# Patient Record
Sex: FEMALE | Race: BLACK OR AFRICAN AMERICAN | URBAN - METROPOLITAN AREA
[De-identification: names, ages, dates, MRNs, and addresses within clinical notes are randomized per-mention and may not be internally consistent; named-entity substitution may affect disease eponyms.]

---

## 2020-09-23 ENCOUNTER — OFFICE VISIT (OUTPATIENT)
Dept: FAMILY MEDICINE | Facility: CLINIC | Age: 30
End: 2020-09-23
Payer: COMMERCIAL

## 2020-09-23 VITALS
DIASTOLIC BLOOD PRESSURE: 85 MMHG | WEIGHT: 262 LBS | HEIGHT: 65 IN | OXYGEN SATURATION: 98 % | TEMPERATURE: 98.2 F | BODY MASS INDEX: 43.65 KG/M2 | SYSTOLIC BLOOD PRESSURE: 122 MMHG | RESPIRATION RATE: 16 BRPM | HEART RATE: 100 BPM

## 2020-09-23 DIAGNOSIS — F64.0 GENDER DYSPHORIA IN ADULT: Primary | ICD-10-CM

## 2020-09-23 LAB
% GRANULOCYTES: 56.9 %G (ref 40–75)
ALBUMIN SERPL-MCNC: 4.2 MG/DL (ref 3.8–5)
ALP SERPL-CCNC: 59.2 U/L (ref 31.7–110.5)
ALT SERPL-CCNC: 34.7 U/L (ref 0–45)
AST SERPL-CCNC: 26 U/L (ref 0–45)
BILIRUB SERPL-MCNC: 0.5 MG/DL (ref 0.2–1.3)
BILIRUBIN DIRECT: 0.2 MG/DL (ref 0.1–0.3)
CHOLEST SERPL-MCNC: 220.1 MG/DL (ref 0–200)
CHOLEST/HDLC SERPL: 4.8 {RATIO} (ref 0–5)
GLUCOSE SERPL-MCNC: 82 MG'DL (ref 70–99)
GRANULOCYTES #: 2.8 K/UL (ref 1.6–8.3)
HCT VFR BLD AUTO: 46.6 % (ref 35–47)
HDLC SERPL-MCNC: 46 MG/DL
HEMOGLOBIN: 13.1 G/DL (ref 11.7–15.7)
LDLC SERPL CALC-MCNC: 142 MG/DL (ref 0–129)
LYMPHOCYTES # BLD AUTO: 1.9 K/UL (ref 0.8–5.3)
LYMPHOCYTES NFR BLD AUTO: 37.9 %L (ref 20–48)
MCH RBC QN AUTO: 26.4 PG (ref 26.5–35)
MCHC RBC AUTO-ENTMCNC: 28.1 G/DL (ref 32–36)
MCV RBC AUTO: 93.7 FL (ref 78–100)
MID #: 0.3 K/UL (ref 0–2.2)
MID %: 5.2 %M (ref 0–20)
PLATELET # BLD AUTO: 229 K/UL (ref 150–450)
PROT SERPL-MCNC: 7.2 G/DL (ref 6.8–8.8)
RBC # BLD AUTO: 4.97 M/UL (ref 3.8–5.2)
TRIGL SERPL-MCNC: 159.4 MG/DL (ref 0–150)
VLDL CHOLESTEROL: 31.9 MG/DL (ref 7–32)
WBC # BLD AUTO: 5 K/UL (ref 4–11)

## 2020-09-23 RX ORDER — TESTOSTERONE CYPIONATE 1000 MG/10ML
100 INJECTION, SOLUTION INTRAMUSCULAR
COMMUNITY
End: 2021-05-17

## 2020-09-23 RX ORDER — ESCITALOPRAM OXALATE 10 MG/1
10 TABLET ORAL DAILY
COMMUNITY
End: 2021-05-17

## 2020-09-23 SDOH — HEALTH STABILITY: PHYSICAL HEALTH: ON AVERAGE, HOW MANY DAYS PER WEEK DO YOU ENGAGE IN MODERATE TO STRENUOUS EXERCISE (LIKE A BRISK WALK)?: 7 DAYS

## 2020-09-23 SDOH — ECONOMIC STABILITY: FOOD INSECURITY: WITHIN THE PAST 12 MONTHS, YOU WORRIED THAT YOUR FOOD WOULD RUN OUT BEFORE YOU GOT MONEY TO BUY MORE.: NEVER TRUE

## 2020-09-23 SDOH — SOCIAL STABILITY: SOCIAL INSECURITY: WITHIN THE LAST YEAR, HAVE YOU BEEN AFRAID OF YOUR PARTNER OR EX-PARTNER?: NO

## 2020-09-23 SDOH — ECONOMIC STABILITY: FOOD INSECURITY: WITHIN THE PAST 12 MONTHS, THE FOOD YOU BOUGHT JUST DIDN'T LAST AND YOU DIDN'T HAVE MONEY TO GET MORE.: NEVER TRUE

## 2020-09-23 SDOH — ECONOMIC STABILITY: TRANSPORTATION INSECURITY
IN THE PAST 12 MONTHS, HAS LACK OF TRANSPORTATION KEPT YOU FROM MEETINGS, WORK, OR FROM GETTING THINGS NEEDED FOR DAILY LIVING?: NO

## 2020-09-23 SDOH — SOCIAL STABILITY: SOCIAL INSECURITY
WITHIN THE LAST YEAR, HAVE TO BEEN RAPED OR FORCED TO HAVE ANY KIND OF SEXUAL ACTIVITY BY YOUR PARTNER OR EX-PARTNER?: NO

## 2020-09-23 SDOH — SOCIAL STABILITY: SOCIAL INSECURITY: WITHIN THE LAST YEAR, HAVE YOU BEEN HUMILIATED OR EMOTIONALLY ABUSED IN OTHER WAYS BY YOUR PARTNER OR EX-PARTNER?: NO

## 2020-09-23 SDOH — HEALTH STABILITY: PHYSICAL HEALTH: ON AVERAGE, HOW MANY MINUTES DO YOU ENGAGE IN EXERCISE AT THIS LEVEL?: 30 MIN

## 2020-09-23 SDOH — ECONOMIC STABILITY: INCOME INSECURITY: HOW HARD IS IT FOR YOU TO PAY FOR THE VERY BASICS LIKE FOOD, HOUSING, MEDICAL CARE, AND HEATING?: SOMEWHAT HARD

## 2020-09-23 SDOH — HEALTH STABILITY: MENTAL HEALTH
STRESS IS WHEN SOMEONE FEELS TENSE, NERVOUS, ANXIOUS, OR CAN'T SLEEP AT NIGHT BECAUSE THEIR MIND IS TROUBLED. HOW STRESSED ARE YOU?: VERY MUCH

## 2020-09-23 SDOH — SOCIAL STABILITY: SOCIAL INSECURITY
WITHIN THE LAST YEAR, HAVE YOU BEEN KICKED, HIT, SLAPPED, OR OTHERWISE PHYSICALLY HURT BY YOUR PARTNER OR EX-PARTNER?: NO

## 2020-09-23 SDOH — ECONOMIC STABILITY: TRANSPORTATION INSECURITY
IN THE PAST 12 MONTHS, HAS THE LACK OF TRANSPORTATION KEPT YOU FROM MEDICAL APPOINTMENTS OR FROM GETTING MEDICATIONS?: NO

## 2020-09-23 ASSESSMENT — MIFFLIN-ST. JEOR: SCORE: 1918.68

## 2020-09-23 NOTE — PATIENT INSTRUCTIONS
Here is the plan from today's visit    1. Gender dysphoria in adult  Follow up in 2 months  - Testosterone Total  - Hepatic Panel  - CBC with Diff Plt  - Lipid Cascade  - Glucose    neither   Some online resources for transgender health    [place] Transgender [facility]   Home to [facility] [address], [phone]. Also has support groups.  Http://www.[employer].org/   Wednesdays: Support Group 6-7:30pm for all gender variant people    [facility] Transgender Health  Increasing access to comprehensive, effective, and affirming healthcare services for trans and gender-variant communities.  http://www.transhealth.[facility].edu/trans?page=valdez-00-05    [facility]   Community-based non-profit committed to advancing the health & wellness of LGBTQ communities through research, education and advocacy. http://www.TrustYou.org    Safe, gender-neutral public restrooms in the [facility]   http://www.[employer].org//index.php?option=com_content&task=view&id=12&Itemid    Trans Youth Support Network and [employer]  For people 26 and under who identify as a trans or gender non-conforming person and want to be a part of an activist organization. Offers peer support, education and community building opportunities. Find them on Facebook!    Reclaim  RECLAIM offers mental and integrative health services for LGBTQ youth and their families.  http://www.reclaim-lgbtyouth.org/    Gender Spectrum, for Trans Youth  Gender Spectrum provides education, training and support to help create a gender sensitive and inclusive environment for all children and teens. http://www.genderspectrum.org/    [facility] FTM Resource Guide  Information on topics of interest to female-to-male (FTM, F2M) trans men, and their friends and loved ones.  http://ftmguide.org/    Also check out your local [employer] queer resource center!  LGBTQIA Services at [facility]:  "http://www.[facility]/lgbtqia/  LGBTQ@[facility] at [facility]: http://www.[facility]/multiculturallife/lgbtq/  GLBTA Programs office at  of : https://diversity.[facility]/glbta/    Thoughts of self harm?   Trans Lifeline can be reached at [phone]. This service is staffed by trans people 24/7.   For LGBT youth (ages 24 and younger) contemplating suicide, the [employer] Lifeline can be reached at [phone].    nor Gender Transition Law: Know your Rights. Last updated   From: National  Guild [place] Chapter  Http://nlg[place].TotSpot    Changing your Name  Anyone who is over the age of 18 and has lived in [place] for at least 6 months can file an application for a name change in [place]. The \"Application for a Name Change\" must be filed in the [facility] where the applicant currently lives. The applicant must either pay the filing fee (which is currently $322 in [facility] and $320 in [facility]) or get a fee waiver based on low income. Information about how to obtain a fee waiver is available on the [facility] court website at: www.[facility].gov/.    After the Application and related forms have been filled out, brought to the  to be filed, and the file fee has been paid, the  will give you a hearing date and time for when you must appear before a . Alternatively, the  may tell you how to schedule your hearing. Many courts have help centers and /or information on their websites to help people prepare to represent themselves at their hearing.     Under [place] law, a name change of a minor requires that both parents have notice that an Application for a Name Change for their minor child will be filed. The applicant must show proof that the non-applicant parent(s) have been served with a Notice of Hearing. If the non-applicant parent cannot be served, then the applicant must publish the Notice of Hearing in the legal " "newspaper in the county were the non-applicant parent was last known to have lived. If the applicant parent does not know who the other parent is, then they must bring a certified copy of the child's birth certificate to the court hearing to show that the non-applicant parent's name is not on the birth certificate.    An applicant for a name change must bring two witnesses to the court hearing to testify about their identity. If the Application is made on behalf of a minor, the minor must be present at the court hearing.     Before filing an Application for a Name Change, an applicant should keep two considerations in mind. First, it is illegal to change your name to avoid legal obligations like paying debts, being sued, or being arrested or charged with a crime. Second, any information in an individual's name change file may be accessed by the public unless the individual's name is being changed because they are in a victim or witness protection program.     If the  approves the Application, they will sign a document called an \"order Granting Name Change.\" This Order will be entered into the court's record. Changing important documents like 's licenses, Social Security cards, and bank accounts requires certified copies of the Order, which can be obtained from the  for a fee. One certified copy of the Order should also be kept with the individual to notify certain public agencies, officials or other third parties about their name change.     It is also common for individuals to begin using a new name without formally changing their name. This is legal as long as the individual is not using the new name to commit fraud or other illegal activity.    Changing your Birth Certificate  If you intend to change both your name and your gender marker on your birth certificate, you may file an Application for a Name Change with the court. Changes to your birth certificate will only be made if you " "can prove that there are exceptional circumstances that make changing the birth certificate necessary. You must state your reasons on your Application form and specifically ask the  to order the Office of Vital Records ([place] Dept of Health) to change the birth certificate, indicating which specific items on the birth certificate are to be changed, and then submit a certified copy of the court order to the Office of Vital Records with the change fee, which is currently $40. To determine if you can use your court order to amend a birth record, review the Requirements Checklist for Amending a Birth Record With a Court Order, available at www.health.state.mn.. An 's advice may be useful when pursuing this option.     Changing your 's Licence/State Identification  To change your name on your 's license or ID, bring the court order demonstrating your name change to a  and Vehicle Services location. Be sure to bring your current license/ID as well. Updated licenses are issued for $13.50 .    To change the gender marker on your license or ID requires a \"variance\" issued by the central office of [place]  and Vehicle services in [place]. You must complete a \"Petition for [facility] Rules 7410 Identify/Residency\" and file it with the central office, being sure to include photocopies of your supporting documentation (such as a new or amended birth certificate and/or a letter from a physician or surgeon confirming some form of permanent physical transition) and a check or money order for $10.    Changing your Name/Gender Marker with the Social Security Administration  Changing your name and gender marker with the Social Security Administration, which will result in both a change to your [facility] file and, at your request, a new card being issued, can be accomplished by submitting documentation of both your \"old\" and \"new\" legal identities, suca as a copy of the court order " "used to changed your name or birth certificate. The [facility] also requires a physician's letter indicating that some permanent physical transition has occurred.     Changing your Passport  A copy of the court order confirming your name change is required to change the name on your passport. To change the gender marker, the State Department requires a detailed statement from a surgeon or hospital indicating that you have, or plan on having corrective surgery. Note however, the the [facility] Department will simply \"stamp over\" the previous gender marker on your passport if you request an amendment, indicating the date on which the gender change took place. The State Department may require additional documents to process your request.     GOVERNMENT RESOURCE LIST    [facility]  [address] [place] [address]  [phone]  Www.[employer].[facility] Courts  [address] [place] [address]  [phone]  www.[place].gov    [place] Dept of Health and Office of Vital Records  [address] [address]  [place] [address]  [phone]  www.health.[facility].mn.[facility] Dept of Public Safety  and Vehicle Services  [address]  [place] [address]  [phone]  www.[facility].org/[place]/    [place] Social Security Offices  [phone]    [place] Office  [address] [place] [address]    [facility] Office  [address] [place] [address] [address]  [place] [address]       nor Informed Consent Form for Testosterone Therapy   This form refers to the use of testosterone by persons in the female-to-male spectrum who wish to become more masculine to reduce gender dysphoria and facilitate a more masculine gender presentation. While there are risks associated with taking testosterone, when appropriately prescribed it can greatly improve mental health and quality of life.   Please seek another opinion if you want additional perspective on any aspect of your care.     Masculinizing Effects     1. I understand " that testosterone may be prescribed to reduce female physical characteristics and masculinize my body.     2. I understand that the masculinizing effects of testosterone can take several months to a year to become noticeable, that the rate and degree of change can t be predicted, and that changes may not be complete for 2-5 years after I start testosterone.     3. I understand that these changes will likely be permanent even if I stop taking testosterone:    Lower voice pitch (i.e., voice becoming deeper).     Increased growth of hair, with thicker/coarser hairs, on arms, legs, chest, back, and abdomen.     Gradual growth of moustache/facial hair.     Hair loss at the temples and crown of the head, with the possibility of becoming completely bald.     Genital changes may or may not be permanent if testosterone is stopped. These include clitoral growth (typically 1-3 cm) and vaginal dryness.     4. I understand that the following changes are usually not permanent (that is, they will likely reverse if I stop taking testosterone). Although testosterone does not change these features, there are other treatments that may be helpful:    Acne, which may be severe and can cause permanent scarring if not treated.     Fat may redistribute to a more masculine pattern (decreased on buttocks/hips/thighs, increased in abdomen - changing from  pear shape  to  apple shape ).     Increased muscle mass and upper body strength.     Increased libido (sex drive).     Menstrual periods typically stop within 3-6 months of starting testosterone.     5. I understand that it is not known what the effects of testosterone are on fertility. Fertility is reduced and I may never be able to get pregnant, even if I stop testosterone. On the other hand, even if my period stops, it may still be possible for me to get pregnant, and I am aware of birth control options (if applicable). I have been informed that I can t take testosterone if I am  pregnant.     6. I understand that there are some aspects of my body that will not be changed by testosterone:     Breasts may appear slightly smaller due to fat loss, but will not substantially shrink.     Although voice pitch will likely drop, other aspects of speech will not become more masculine.     Risks of Testosterone     7. I understand that the medical effects and safety of testosterone are not fully understood, and that there may be long-term risks that are not yet known.     8. I understand that I am strongly advised not to take more testosterone than I am prescribed, as this increases health risks. I have been informed that taking more will not make masculinization happen more quickly or increase the degree of change.     9. I understand that testosterone can cause changes that increase my risk of heart disease, including:     decreasing good cholesterol (HDL) and increasing bad cholesterol (LDL)     increasing blood pressure     increasing deposits of fat around my internal organs    I have been advised that my risks of heart disease are greater if people in my family have had heart disease, if I am overweight, or if I smoke.   I have been advised that heart health checkups, including monitoring of my weight and cholesterol levels, should be done periodically as long as I am taking testosterone.     10. I understand that testosterone can damage the liver, possibly leading to liver disease. I have been advised that I should be monitored for as long as I am taking testosterone.     11. I understand that testosterone can increase the red blood cells and hemoglobin, and while the increase is usually only to a normal male range (which does not pose health risks), a high increase can cause potentially life-threatening problems such as stroke and heart attack. I have been advised that my blood should be monitored periodically while I am taking testosterone.     12. I understand that taking testosterone can  increase my risk for diabetes by decreasing my body s response to insulin, causing weight gain, and increasing deposits of fat around my internal organs. I have been advised that my fasting blood glucose should be monitored periodically while I am taking testosterone.     13. I understand that it is not known if testosterone increases the risk of ovarian, breast, or uterine cancer.     14. I understand that taking testosterone can lead to my cervix and the walls of my vagina becoming more fragile, and that this can lead to tears or abrasions that increase the risk of sexually transmitted infections (including HIV) if I have vaginal sex - no matter what the gender of my partner is. I have been advised that camryn discussion with my doctor about my sexual practices can help determine how best to prevent and monitor for sexually transmitted infections.     15. I have been informed that testosterone can cause headaches or migraines. I understand that if I am frequently having headaches or migraines, or the pain is unusually severe, it is recommended that I talk with my health care provider.     16. I understand that testosterone can cause emotional changes, including increased irritability, frustration, and anger. I have been advised that my doctor can assist me in finding resources to explore and cope with these changes.     17. I understand that testosterone will result in changes that will be noticeable by other people, and that some transgender people in similar circumstances have experienced harassment, discrimination, and violence, while others have lost support of loved ones. I have been advised that my doctor can assist me in finding advocacy and support resources.     Prevention of Medical Complications     18. I agree to take testosterone as prescribed and to tell my doctor if I am not happy with the treatment or am experiencing any problems.     19. I understand that the right dose or type of medication  prescribed for me may not be the same as for someone else.     20. I understand that physical examinations and blood tests are needed on a regular basis to check for negative side effects of testosterone.     21. I understand that testosterone can interact with other medication (including other sources of hormones), dietary supplements, herbs, alcohol, and street drugs. I understand that being honest with my doctor about what else I am taking will help prevent medical complications that could be life-threatening. I have been informed that I will continue to get medical care no matter what information I share.     22. I understand that some medical conditions make it dangerous to take testosterone. I agree that I will be checked for risky conditions before the decision to take testosterone is made.     23. I understand that I can choose to stop taking testosterone at any time, and that it is advised that I do this with the help of my doctor to make sure there are no negative reactions to stopping. I understand that my doctor may suggest I reduce or stop taking testosterone if there are severe side effects or health risks that can t be controlled.     My signature below confirms that:     My doctor has talked with me about the benefits and risks of testosterone, the possible or likely consequences of hormone therapy, and potential alternative treatment options.     I understand the risks that may be involved.     I understand that this form covers known effects and risks and that there may be long-term effects or risks that are not yet known.     I have had sufficient opportunity to discuss treatment options with my doctor. All of my questions have been answered to my satisfaction.     I believe I have adequate knowledge on which to base informed consent to the provision of testosterone therapy.     Based on this:   _____ I wish to begin taking testosterone.     _____ I do not wish to begin taking testosterone at this  time.     Whatever your current decision is, please talk with your doctor any time you have questions, concerns, or want to re-evaluate your options.         ____________________________________ __________________   Patient Signature     Date           ____________________________________ __________________   Prescribing clinician Signature    Date      Please call or return to clinic if your symptoms don't go away.    Follow up plan  Please make a clinic appointment for follow up with me (VAL LOVE) in 2  months for HRT Follow.    Thank you for coming to [facility] today.  Lab Testing:  **If you had lab testing today and your results are reassuring or normal they will be mailed to you or sent through [employer] within 7 days.   **If the lab tests need quick action we will call you with the results.  The phone number we will call with results is # [phone] (home) . If this is not the best number please call our clinic and change the number.  Medication Refills:  If you need any refills please call your pharmacy and they will contact us.   If you need to  your refill at a new pharmacy, please contact the new pharmacy directly. The new pharmacy will help you get your medications transferred faster.   Scheduling:  If you have any concerns about today's visit or wish to schedule another appointment please call our office during normal business hours [phone] (8-5:00 M-F)  If a referral was made to a [facility] and you don't get a call from central scheduling please call [phone].  If a Mammogram was ordered for you at [facility] call [phone] to schedule or change your appointment.  If you had an XRay/CT/Ultrasound/MRI ordered the number is [phone] to schedule or change your radiology appointment.   Medical Concerns:  If you have urgent medical concerns please call [phone] at any time of the day.    Val Love MD

## 2020-09-23 NOTE — PROGRESS NOTES
Preceptor Attestation:    Patient seen and evaluated in person. I discussed the patient with the resident. I have verified the content of the note, which accurately reflects my assessment of the patient and the plan of care.   Supervising Physician:  Germaine Anderson MD.

## 2020-09-23 NOTE — PROGRESS NOTES
"Gender History Intake:       HPI     Patient has primary care outside of [facility]? no  Seeking primary care, hormonal care, surgical care? Seeking primary care and hormonal care moved here from [place], [place] 1 month.     1-How do you identify your gender? Choose all that apply.  other Trans persons; ligia soler   2-What approximately what age did you first feel your gender identity (internal sense of gender) did not match your physical body?      19 years old   3-Have you ever felt depressed or suicidal because your gender identity and your body don't match?      YES ; now feels pretty good  4- Who knows about your gender identity?  everyone     5- Do you have a romantic partner?    \"Yes\" (two)   Are they aware of your GI?  yes  6-How are you \"out?\"    Pretty out; dressing, name (changed legally), pronound    7-Have you legally changed your name? yes    If yes: prior name for KYLE:    Gender marker on ID's?  no  8-Have you ever seen a health care provider about being transgender?  YES Steven Quispe back in [place]   When were you first treated and where? 2013  9-What hormones have you been on and for how long?  YES:  Date started: 2013  Type of Hormone: Testosterone SQ     10-Ever had any problems with hormone treatment?  No   11-If not on hormones, would you like to be?   N/A  12-What are your goals for hormone therapy ?  Maintaining    13-Have you had any gender affirmation surgeries?  YES top surgery; [place] [facility] [place]  14-Do you want to have surgery now or in future? No   15-Sex assigned at birth?    female  16. How do you describe your sexual or romantic orientation? Karlo  17-What are your other questions or concerns today?  Is there anything else we should know about you?       YES Lexapro and weight gain and switching to gels    ----------  TK is a 30 year old individual that uses  pronouns Zie/Zim/Zir/Zis/Zieself that presents today for interest in masculinizing hormone therapy to better align " their body with their gender identity.  Came to this clinic via referral from: word of mouth    Past Surgical History:   Procedure Laterality Date     top surgery Bilateral 2018       There is no problem list on file for this patient.    Past Medical History:   Diagnosis Date     Depressive disorder        Current Outpatient Medications   Medication Sig Dispense Refill     escitalopram (LEXAPRO) 10 MG tablet Take 10 mg by mouth daily       testosterone cypionate (DEPOTESTOSTERONE) 100 MG/ML injection Inject 100 mg into the muscle every 14 days Takes weekly         History   Smoking Status     Never Smoker   Smokeless Tobacco     Never Used       Family History   Problem Relation Age of Onset     Heart Disease Mother      Hypertension Mother      Diabetes Father      Heart Disease Father      Hypertension Father      Hyperlipidemia Father      Hx of DVT in family? no  No Known Allergies    Mental Health Assessment:  Non gender related Therapist? Yes  Gender therapist?    No  Chemical use history   No  Mental Health diagnosis history Depression  Self harm   History in past   Cutting in teenage years and early twenties    Current   no  - Depression   No flowsheet data found.  No flowsheet data found.  Medications prescribed for above and by whom? Lexapro previous PCP  KYLE sent to:  Previous PCP         Review of Systems:   CONSTITUTIONAL: NEGATIVE for fever, chills, change in weight  INTEGUMENTARY/SKIN: NEGATIVE for worrisome rashes, moles or lesions  EYES: NEGATIVE for vision changes or irritation  ENT/MOUTH: NEGATIVE for ear, mouth and throat problems  RESP: NEGATIVE for significant cough or SOB  BREAST: NEGATIVE for masses, tenderness or discharge  CV: NEGATIVE for chest pain, palpitations or peripheral edema  GI: NEGATIVE for nausea, abdominal pain, heartburn, or change in bowel habits  : NEGATIVE for frequency, dysuria, or hematuria  MUSCULOSKELETAL: NEGATIVE for significant arthralgias or myalgia  NEURO:  NEGATIVE for weakness, dizziness or paresthesias  ENDOCRINE: NEGATIVE for temperature intolerance, skin/hair changes  HEME/ALLERGY: NEGATIVE for bleeding problems  PSYCHIATRIC: NEGATIVE for changes in mood or affect         Social History     Social History     Socioeconomic History     Marital status: Single     Spouse name: None     Number of children: None     Years of education: None     Highest education level: None   Occupational History     None   Social Needs     Financial resource strain: None     Food insecurity     Worry: None     Inability: None     Transportation needs     Medical: None     Non-medical: None   Tobacco Use     Smoking status: Never Smoker     Smokeless tobacco: Never Used   Substance and Sexual Activity     Alcohol use: Yes     Comment: social drinker     Drug use: Yes     Types: Marijuana     Sexual activity: Yes     Partners: Female, Male   Lifestyle     Physical activity     Days per week: None     Minutes per session: None     Stress: None   Relationships     Social connections     Talks on phone: None     Gets together: None     Attends Zoroastrianism service: None     Active member of club or organization: None     Attends meetings of clubs or organizations: None     Relationship status: None     Intimate partner violence     Fear of current or ex partner: None     Emotionally abused: None     Physically abused: None     Forced sexual activity: None   Other Topics Concern     None   Social History Narrative     None     Who lives in your household? With mother  What do you do?  work    Has anyone hurt you physically, for example by pushing, hitting, slapping or kicking you or forcing you to have sex? Denies  Do you feel threatened or controlled by a partner, ex-partner or anyone in your life? Denies    Sexual Health     Sexual concerns:  No   Hx of sexual abuse:  No   STI History:   Neg  Pregnancy History:  No obstetric history on file.  Last Pap Smear :  No results found for:  "PAP  Abnormal Pap Hx:  None    Relationships  Partners include:   cismen  ciswomen  gender nonbinary people  transmen  transwomen  Current partners number: 2  Current partners    have sperm?  no   able to get pregnant? no   Fertility plans?   None; maybe in the future           Physical Exam:     Vitals:    09/23/20 1311   BP: 122/85   Pulse: 100   Resp: 16   Temp: 98.2  F (36.8  C)   TempSrc: Oral   SpO2: 98%   Weight: 118.8 kg (262 lb)   Height: 1.65 m (5' 4.96\")     BMI= Body mass index is 43.65 kg/m .   Appearance: masculine appearance and dress  GENERAL:: healthy, alert and no distress  EYES: Eyes grossly normal to inspection, fundi benign and PERRL  NECK: no adenopathy, no asymmetry, no masses,  and thyroid normal to palpation, supple  RESP: lungs clear to auscultation - no rales, no rhonchi, no wheezes  CV: regular rates and rhythm, normal S1 S2, no S3 or S4 and no murmur, no click or rub -  ABDOMEN: soft, no tenderness, no  hepatosplenomegaly, no masses, normal bowel sounds  MS: extremities normal- no gross deformities noted, no edema  SKIN: no suspicious lesions, no rashes  Psych: Alert and oriented times 3; coherent speech, normal rate and volume, able to articulate logical thoughts, able to abstract reason, no tangential thoughts, no hallucinations or delusions. Affect is \"happy\".  Affect: Appropriate/mood-congruent      Assessment and Plan   Javi was seen today for establish care.    Diagnoses and all orders for this visit:    Gender dysphoria in adult  -     Testosterone Total  -     Hepatic Panel  -     CBC with Diff Plt  -     Lipid Cascade  -     Glucose      Today s visit included assessment of interventions to alleviate symptoms related to gender dysphoria, including     psychological suppor    medical treatment (hormones or blockers)    options for social support or changes in gender expression.   Educated about 3 parts of evaluation:    1. Medical safety for hormones :   Labs done today, see " above   Lacks contraindications to hormonal therapy  KYLE for records sent, will need to be reviewed by: Dr. Val Love  Medication plan:   masculinizing hormone therapy with testosterone     Administration method:  transdermal, topical, injectable  Next labs and exam:   Follow up w/ Dr. Love in 2 months, I will send this note to them. Educated pt about consultant role in a residency clinic.  Recommend monthly visits after dose change or initiation. Next dose increase likely in 2 month if labs and exam are normal.  Goal dose: likely 100 mg weekly  Counselled patient about controlled substances, never share, comes on paper prescription: Yes  Contraception:   none  Educated about testosterone as absolute contraindication in pregnancy: Yes  Fertility plan if starts cross-sex hormones: Thinking about this as a future option, has been on T for years    2. Mental health assessment (may refer out for this if needed)  To be completed by: Dr. Love  Diagnoses are stable and/or are likely to become stabilized by treatment of gender dysphoria    3. Informed consent process.   Consent  Yes Is able to provide informed consent   Yes Likely to take hormones in a responsible manner  Yes Discussed physical effects, benefits, and risk assessment & modification  Yes Discussed the clinical and biochemical monitoring of hormonal changes and the potential impact on reproductive health & fertility  We discussed thoroughly risks/benefits/alternatives of this treatment. Questions elicited and answered. Pt is fully prepared to start hormones and is able to reason through risks and mgmt. Questions elicited and answered and they also consent, as is legally required. See scanned in media tab.     Val Love MD

## 2020-09-25 ENCOUNTER — TELEPHONE (OUTPATIENT)
Dept: FAMILY MEDICINE | Facility: CLINIC | Age: 30
End: 2020-09-25

## 2020-09-25 LAB — TESTOST SERPL-MCNC: 386 NG/DL (ref 8–60)

## 2020-09-25 NOTE — TELEPHONE ENCOUNTER
RN left message for patient to call back. When patient calls back, please relay message below from Dr. Love. Okay to transfer to any available RN if they have questions.   JI Higginbotham, Val Solis MD  [facility] Triage Nurse               Please call patient with the following message Testosterone is at goal at 386, however you cholesterol and lipid levels are slightly elevated. At this level it should not affect still being on testosterone. I think we can try to bring this back down with diet and exercise. At least 30-60 min a day of exercise that gets your heart rate elevated and decreasing the amount of fat in your diet, try looking at the Mediterranean type diet. Please feel free to call with any questions or concerns.     Thank you!

## 2020-09-28 NOTE — TELEPHONE ENCOUNTER
Spoke with patient and relayed information below. Patient verbalized understanding and had no further questions.    Lali Barroso RN

## 2021-03-24 ENCOUNTER — OFFICE VISIT (OUTPATIENT)
Dept: FAMILY MEDICINE | Facility: CLINIC | Age: 31
End: 2021-03-24
Payer: COMMERCIAL

## 2021-03-24 VITALS
WEIGHT: 271.8 LBS | BODY MASS INDEX: 43.68 KG/M2 | DIASTOLIC BLOOD PRESSURE: 88 MMHG | HEART RATE: 88 BPM | RESPIRATION RATE: 16 BRPM | TEMPERATURE: 99.6 F | SYSTOLIC BLOOD PRESSURE: 135 MMHG | OXYGEN SATURATION: 97 % | HEIGHT: 66 IN

## 2021-03-24 DIAGNOSIS — Z13.9 SCREENING FOR CONDITION: ICD-10-CM

## 2021-03-24 DIAGNOSIS — R06.83 SNORING: ICD-10-CM

## 2021-03-24 DIAGNOSIS — F64.0 GENDER DYSPHORIA IN ADULT: Primary | ICD-10-CM

## 2021-03-24 PROCEDURE — 99214 OFFICE O/P EST MOD 30 MIN: CPT | Mod: 25 | Performed by: STUDENT IN AN ORGANIZED HEALTH CARE EDUCATION/TRAINING PROGRAM

## 2021-03-24 PROCEDURE — 90715 TDAP VACCINE 7 YRS/> IM: CPT | Performed by: STUDENT IN AN ORGANIZED HEALTH CARE EDUCATION/TRAINING PROGRAM

## 2021-03-24 PROCEDURE — 90471 IMMUNIZATION ADMIN: CPT | Performed by: STUDENT IN AN ORGANIZED HEALTH CARE EDUCATION/TRAINING PROGRAM

## 2021-03-24 RX ORDER — ESTRADIOL 0.1 MG/G
2 CREAM VAGINAL
Qty: 42.5 G | Refills: 0 | Status: SHIPPED | OUTPATIENT
Start: 2021-03-25

## 2021-03-24 RX ORDER — TESTOSTERONE 1.62 MG/G
2-3 GEL TRANSDERMAL DAILY
Qty: 75 G | Refills: 1 | Status: SHIPPED | OUTPATIENT
Start: 2021-03-24

## 2021-03-24 ASSESSMENT — MIFFLIN-ST. JEOR: SCORE: 1967.5

## 2021-03-24 NOTE — PROGRESS NOTES
Preceptor Attestation:   Patient seen and discussed with the resident. Assessment and plan reviewed with resident and agreed upon.   Supervising Physician:  DO [facility]

## 2021-03-24 NOTE — PROGRESS NOTES
"       HPI   TK is a 31 year old individual that uses pronouns Zie/Zim/Zir/Zis/Zieself that presents today for follow up of:  masculinizing hormone therapy.     Gender identity: trans person    Any special concerns today?  concerns about sleeping  Mom noticed snoring loudly during sleep and would wake up gasping for air. Fatigue can depend on the day. Sleepy most of the time, but not always needing naps. Harder to wake up in the morning. No headaches. No SOB. Sleep gets interrupted a lot, more from poor sleep. Gets 6-8 hours depending.     STOP BANG: 3-4 depending \"gender\"    On hormones?  YES +++ Shot day of the week? Tuesday      Due for labs?  No (only needs annually given on T 7 years)     +++ Refills of meds needed?  Yes    ---    Past Surgical History:   Procedure Laterality Date     top surgery Bilateral 2018       There is no problem list on file for this patient.      Current Outpatient Medications   Medication Sig Dispense Refill     escitalopram (LEXAPRO) 10 MG tablet Take 10 mg by mouth daily       estradiol (ESTRACE) 0.1 MG/GM vaginal cream Place 2 g vaginally twice a week Please use 5-14 days before PAP 42.5 g 0     testosterone (ANDROGEL 1.62 % PUMP) 20.25 MG/ACT gel Place 2-3 Pump onto the skin daily Apply from dispenser to clean, dry, intact skin of the upper arms and shoulders. 75 g 1     testosterone cypionate (DEPOTESTOSTERONE) 100 MG/ML injection Inject 100 mg into the muscle every 14 days Takes weekly         History   Smoking Status     Never Smoker   Smokeless Tobacco     Never Used        No Known Allergies    Problem, Medication and Allergy Lists were reviewed and are current..         Review of Systems:        General    Fat redistribution: no    Weight change: no HEENT    Voice change: no     Cardiovascular (CV)    Chest Pains: no    Shortness of breath: no Chest    Decreased exercise tolerance:  no    Breast changes/development: no     Gastrointestinal (GI)    Abdominal pain: " "no    Change in appetite: no Skin    Acne or oily skin: no    Change in hair: no     Genitourinary ()    Abnormal vaginal bleeding: no     Decreased spontaneous erections: not applicable    Change in libido: no    New sexual partners: YES- One new sexual, non sperm producing  Musculoskeletal    Leg pain or swelling: no     Psychiatric (Psych)    Depression: no    Anxiety/Panic: no    Mood:  \"okay\"                    Physical Exam:     Vitals:    03/24/21 1306   BP: 135/88   Pulse: 88   Resp: 16   Temp: 99.6  F (37.6  C)   TempSrc: Oral   SpO2: 97%   Weight: 123.3 kg (271 lb 12.8 oz)   Height: 1.665 m (5' 5.55\")     BMI= Body mass index is 44.47 kg/m .   Wt Readings from Last 10 Encounters:   03/24/21 123.3 kg (271 lb 12.8 oz)   09/23/20 118.8 kg (262 lb)       GENERAL:: healthy, alert and no distress  NECK: no adenopathy, no asymmetry, no masses,  and thyroid normal to palpation, supple, measured 46 cm  RESP: lungs clear to auscultation - no rales, no rhonchi, no wheezes  CV: regular rates and rhythm, normal S1 S2, no S3 or S4 and no murmur, no click or rub -  SKIN: no suspicious lesions, no rashes  Affect: Bright           Labs:   Results from last visit:  Office Visit on 09/23/2020   Component Date Value Ref Range Status     Testosterone Total 09/23/2020 386* 8 - 60 ng/dL Final    Comment: This test was developed and its performance characteristics determined by the   [facility].   It has not been cleared or approved by the FDA. The laboratory is regulated   under CLIA as qualified to perform high-complexity testing. This test is used   for clinical purposes. It should not be regarded as investigational or for   research.       Protein Total 09/23/2020 7.2  6.8 - 8.8 g/dL Final     Albumin 09/23/2020 4.2  3.8 - 5.0 mg/dL Final     Alkaline Phosphatase 09/23/2020 59.2  31.7 - 110.5 U/L Final     ALT 09/23/2020 34.7  0.0 - 45.0 U/L Final     AST " 09/23/2020 26.0  0.0 - 45.0 U/L Final     Bilirubin Direct 09/23/2020 0.2  0.1 - 0.3 mg/dL Final     Bilirubin Total 09/23/2020 0.5  0.2 - 1.3 mg/dL Final     WBC 09/23/2020 5.0  4.0 - 11.0 K/uL Final     Lymphocytes # 09/23/2020 1.9  0.8 - 5.3 K/uL Final     % Lymphocytes 09/23/2020 37.9  20.0 - 48.0 %L Final     Mid # 09/23/2020 0.3  0.0 - 2.2 K/uL Final     Mid % 09/23/2020 5.2  0.0 - 20.0 %M Final     GRANULOCYTES # 09/23/2020 2.8  1.6 - 8.3 K/uL Final     % Granulocytes 09/23/2020 56.9  40.0 - 75.0 %G Final     RBC 09/23/2020 4.97  3.80 - 5.20 M/uL Final     Hemoglobin 09/23/2020 13.1  11.7 - 15.7 g/dL Final     Hematocrit 09/23/2020 46.6  35.0 - 47.0 % Final     MCV 09/23/2020 93.7  78.0 - 100.0 fL Final     MCH 09/23/2020 26.4* 26.5 - 35.0 pg Final     MCHC 09/23/2020 28.1* 32.0 - 36.0 g/dL Final     Platelets 09/23/2020 229.0  150.0 - 450.0 K/uL Final     Cholesterol 09/23/2020 220.1* 0.0 - 200.0 mg/dL Final     Cholesterol/HDL Ratio 09/23/2020 4.8  0.0 - 5.0 Final     HDL Cholesterol 09/23/2020 46.0  >40.0 mg/dL Final     Triglycerides 09/23/2020 159.4* 0.0 - 150.0 mg/dL Final     VLDL Cholesterol 09/23/2020 31.9  7.0 - 32.0 mg/dL Final     LDL Cholesterol Calculated 09/23/2020 142* 0 - 129 mg/dL Final     Glucose 09/23/2020 82.0  70.0 - 99.0 mg'dL Final       Assessment and Plan     1. Gender dysphoria in adult  Patient wanting to switch from injectable to gel form. Discussed may take awhile for approval and is more expensive then IM/SQ. Will need labs 1 month after switch to assess dosing.   - Testosterone Total; Future  - Hepatic Panel; Future  - CBC with Diff Plt; Future  - Lipid Cascade; Future  - Glucose; Future  - testosterone (ANDROGEL 1.62 % PUMP) 20.25 MG/ACT gel; Place 2-3 Pump onto the skin daily Apply from dispenser to clean, dry, intact skin of the upper arms and shoulders.  Dispense: 75 g; Refill: 1  - estradiol (ESTRACE) 0.1 MG/GM vaginal cream; Place 2 g vaginally twice a week Please use  "5-14 days before PAP  Dispense: 42.5 g; Refill: 0    2. Screening for condition  Patient noted to have snoring and apenic events by family. Stop-BANG score 3-4 depending on \"gender\", either way high-risk.   - SLEEP EVALUATION & MANAGEMENT REFERRAL - [facility] [phone] (Age 15 and up); Future  - HIV Antigen Antibody Combo; Future  - Hepatitis C antibody; Future    3. Snoring  - SLEEP EVALUATION & MANAGEMENT REFERRAL - [facility] [phone] (Age 15 and up); Future    Contraception:   No sperm producing partner.     Counselled patient about controlled substances: Yes    Follow up:  Follow up in 1 month.  Results by mychart  Questions were elicited and answered.     Val Love MD            "

## 2021-03-24 NOTE — PATIENT INSTRUCTIONS
Patient Education   Here is the plan from today's visit    1. Gender dysphoria in adult  - Androgel 2-3 pumps, daily, make sure dry after 4 hours before skin on skin with others  - Testosterone Total; Future  - Hepatic Panel; Future  - CBC with Diff Plt; Future  - Lipid Cascade; Future  - Glucose; Future    2. Screening for condition  - HIV and Hep C  - estrogen cream for future PAP (use 5-14 days prior)    3. Snoring  - Sleep apnea study, number to schedule [phone]      Please call or return to clinic if your symptoms don't go away.    Follow up plan  Please make a clinic appointment for follow up with me (HONG SULLIVAN) in 3  months for HRT.    Thank you for coming to [facility] today.  Lab Testing:  **If you had lab testing today and your results are reassuring or normal they will be mailed to you or sent through [employer] within 7 days.   **If the lab tests need quick action we will call you with the results.  The phone number we will call with results is # [phone] (home) . If this is not the best number please call our clinic and change the number.  Medication Refills:  If you need any refills please call your pharmacy and they will contact us.   If you need to  your refill at a new pharmacy, please contact the new pharmacy directly. The new pharmacy will help you get your medications transferred faster.   Scheduling:  If you have any concerns about today's visit or wish to schedule another appointment please call our office during normal business hours [phone] (8-5:00 M-F)  If a referral was made to a [facility] and you don't get a call from central scheduling please call [phone].  If a Mammogram was ordered for you at [facility] call [phone] to schedule or change your appointment.  If you had an XRay/CT/Ultrasound/MRI ordered the number is [phone] to schedule or change your radiology appointment.   Medical Concerns:  If you have  urgent medical concerns please call [phone] at any time of the day.    Val Love MD

## 2021-03-25 ENCOUNTER — TELEPHONE (OUTPATIENT)
Dept: FAMILY MEDICINE | Facility: CLINIC | Age: 31
End: 2021-03-25

## 2021-03-25 NOTE — TELEPHONE ENCOUNTER
Prior Authorization Retail Medication Request    Medication/Dose: Testosterone   ICD code Gender dysphoria in adult [F64.0]  - Primary     Insurance Name: [employer]  Insurance ID:        Subscriber: [id number]

## 2021-03-31 NOTE — TELEPHONE ENCOUNTER
PA Initiation    Medication: testosterone (ANDROGEL 1.62 % PUMP) 20.25 MG/ACT gel- INITATED  Insurance Company: [employer] - Phone [phone] Fax [phone]  Pharmacy Filling the Rx: "[id number] - [place], [place] - [address] AT [facility] [address]  Filling Pharmacy Phone: [phone]  Filling Pharmacy Fax: [phone]  Start Date: 3/31/2021

## 2021-03-31 NOTE — TELEPHONE ENCOUNTER
Prior Authorization Approval    Authorization Effective Date: 3/31/2021  Authorization Expiration Date: 3/30/2026  Medication: testosterone 20.25 MG/ACT gel- APPROVED  Approved Dose/Quantity: 75 GRAM  Reference #: [id number]   Insurance Company: [employer] - Phone [phone] Fax [phone]  Expected CoPay:       CoPay Card Available:      Foundation Assistance Needed:    Which Pharmacy is filling the prescription (Not needed for infusion/clinic administered): [id number] - [place], [place] - [address] AT [facility] [address]  Pharmacy Notified: Yes  Patient Notified: Yes              Central Prior Authorization Team  Phone: [phone]

## 2021-05-01 ENCOUNTER — HEALTH MAINTENANCE LETTER (OUTPATIENT)
Age: 31
End: 2021-05-01

## 2021-05-17 ENCOUNTER — VIRTUAL VISIT (OUTPATIENT)
Dept: SLEEP MEDICINE | Facility: CLINIC | Age: 31
End: 2021-05-17
Payer: COMMERCIAL

## 2021-05-17 VITALS — BODY MASS INDEX: 46.07 KG/M2 | HEIGHT: 63 IN | WEIGHT: 260 LBS

## 2021-05-17 DIAGNOSIS — R40.0 DAYTIME SOMNOLENCE: ICD-10-CM

## 2021-05-17 DIAGNOSIS — R06.81 WITNESSED APNEIC SPELLS: Primary | ICD-10-CM

## 2021-05-17 DIAGNOSIS — E66.01 MORBID OBESITY (H): ICD-10-CM

## 2021-05-17 DIAGNOSIS — R06.83 SNORING: ICD-10-CM

## 2021-05-17 PROCEDURE — 99203 OFFICE O/P NEW LOW 30 MIN: CPT | Mod: GT | Performed by: NURSE PRACTITIONER

## 2021-05-17 ASSESSMENT — MIFFLIN-ST. JEOR: SCORE: 1873.48

## 2021-05-17 NOTE — PROGRESS NOTES
"TK is a 31 year old who is being evaluated via a billable video visit.       How would you like to obtain your AVS? MyChart  If the video visit is dropped, the invitation should be resent by: Send to e-mail at: [email or web address]  Will anyone else be joining your video visit? No         Video-Visit Details     Type of service:  Video Visit  Video Start Time: 1:32 PM  Video End Time:2:00 PM    Originating Location (pt. Location): Home     Distant Location (provider location):  [facility]      Platform used for Video Visit: [facility], 5/17/2021 10:34 AM      Sleep Consultation:    Date on this visit: 5/17/2021    Javi Carranza is sent by Val Love for a sleep consultation regarding loud snoring, waking up gasping, daytime somnolence, difficulty sleeping, evaluate for SHERRI.    Primary Physician: Val Love     Javi Carranza is a 31-year-old patient with a past medical history pertinent for depression who presents via video telehealth visit with reports of nightly snoring, gasping, choking, snorting, poor quality of sleep and \"dehydrated\" for at least the last 6 months.     Javi goes to sleep at 11:00 PM - 12:00 AM during the week.  The patient wakes up at 7:00 - 8:30 AM with an alarm.  The patient falls asleep in 5-20 minutes.  Javi has difficulty falling asleep.  The patient wakes up 2-3 times a night for 5-45 minutes before falling back to sleep.  Javi wakes up to go to the bathroom, uncertain reasons and awakens due to gasping.  On weekends, Javi goes to sleep at 1:00 - 2:00 AM.  The patient wakes up at 8:00 AM with an alarm.  Javi falls asleep in 5-20 minutes.  Patient gets an average of 5-7 hours of sleep per night.     Patient does use electronics in bed and watch TV in bed and does not leave the TV on all night, worry in bed about nothing and read in bed.     Javi does do shift work.  The patient works day " shifts and occasional evening events must attend at [facility].    Javi does snore every night and snoring is loud. Patient does not have a regular bed partner. There is report of snoring, gasping and poor quality of sleep.  Javi does have witnessed apneas.  Patient sleeps on her side. Javi has occasional snort arousals, morning dry mouth and morning confusion, denies occasional morning headaches and restless legs. Javi has frequent bruxism 7 times per week and denies any sleep walking, sleep talking, dream enactment, sleep paralysis, cataplexy and hypnogogic/hypnopompic hallucinations.    Javi denies sleep walking, sleep talking, enuresis and sleep terrors as a child.  Javi has difficulty breathing through her nose, reflux at night and heartburn, denies claustrophobia and depression.      Javi has gained 15 pounds in the last year.  Patient describes themself as neither a morning or night person.  She would prefer to go to sleep at 10:00 PM and wake up at 9:00 AM.  Patient's Forest Sleepiness score 7/24 consistent with mild daytime sleepiness.      Javi naps 1-2 times per weekend for 120-180 minutes, feels refreshed after naps. Javi takes some inadvertant naps.  Javi does not drive.  Javi uses 1 cups/day of coffee. Last caffeine intake is usually before 12 Noon - 1PM.    Allergies:    No Known Allergies    Medications:    Current Outpatient Medications   Medication Sig Dispense Refill     estradiol (ESTRACE) 0.1 MG/GM vaginal cream Place 2 g vaginally twice a week Please use 5-14 days before PAP 42.5 g 0     testosterone (ANDROGEL 1.62 % PUMP) 20.25 MG/ACT gel Place 2-3 Pump onto the skin daily Apply from dispenser to clean, dry, intact skin of the upper arms and shoulders. 75 g 1       Problem List:  There are no active problems to display for this patient.       Past Medical/Surgical History:  Past Medical History:   Diagnosis Date     Depressive disorder      Past  Surgical History:   Procedure Laterality Date     top surgery Bilateral 2018       Social History:  Social History     Socioeconomic History     Marital status: Single     Spouse name: Not on file     Number of children: Not on file     Years of education: Not on file     Highest education level: Not on file   Occupational History     Occupation: assissant director      Employer: [employer]   Social Needs     Financial resource strain: Somewhat hard     Food insecurity     Worry: Never true     Inability: Never true     Transportation needs     Medical: No     Non-medical: No   Tobacco Use     Smoking status: Never Smoker     Smokeless tobacco: Never Used   Substance and Sexual Activity     Alcohol use: Yes     Comment: social drinker     Drug use: Yes     Types: Marijuana     Sexual activity: Yes     Partners: Female, Male   Lifestyle     Physical activity     Days per week: 7 days     Minutes per session: 30 min     Stress: Very much   Relationships     Social connections     Talks on phone: Not on file     Gets together: Not on file     Attends [employer] service: Not on file     Active member of club or organization: Not on file     Attends meetings of clubs or organizations: Not on file     Relationship status: Not on file     Intimate partner violence     Fear of current or ex partner: No     Emotionally abused: No     Physically abused: No     Forced sexual activity: No   Other Topics Concern     Not on file   Social History Narrative     Not on file       Family History:  Family History   Problem Relation Age of Onset     Heart Disease Mother      Hypertension Mother      Diabetes Father      Heart Disease Father      Hypertension Father      Hyperlipidemia Father      - Dad with SHERRI on CPAP      Review of Systems:  A complete review of systems reviewed by me is negative with the exeption of what has been mentioned in the history of present illness.  CONSTITUTIONAL: NEGATIVE for weight  "gain/loss, fever, chills, sweats or night sweats, drug allergies.  EYES: NEGATIVE for changes in vision, blind spots, double vision.  ENT: NEGATIVE for ear pain, sore throat, sinus pain, post-nasal drip, runny nose, bloody nose  CARDIAC: NEGATIVE for fast heartbeats or fluttering in chest, chest pain or pressure, breathlessness when lying flat, swollen legs or swollen feet.  NEUROLOGIC: NEGATIVE headaches, weakness or numbness in the arms or legs.  DERMATOLOGIC: NEGATIVE for rashes, new moles or change in mole(s)  PULMONARY: NEGATIVE SOB at rest, SOB with activity, dry cough, productive cough, coughing up blood, wheezing or whistling when breathing.    GASTROINTESTINAL: NEGATIVE for nausea or vomitting, loose or watery stools, fat or grease in stools, constipation, abdominal pain, bowel movements black in color or blood noted.  GENITOURINARY: NEGATIVE for pain during urination, blood in urine, urinating more frequently than usual, irregular menstrual periods.  MUSCULOSKELETAL: NEGATIVE for muscle pain, bone or joint pain, swollen joints.  ENDOCRINE: NEGATIVE for increased thirst or urination, diabetes.  LYMPHATIC: NEGATIVE for swollen lymph nodes, lumps or bumps in the breasts or nipple discharge.    Physical Examination:  Vitals: Ht 1.6 m (5' 3\")   Wt 117.9 kg (260 lb)   BMI 46.06 kg/m    BMI= Body mass index is 46.06 kg/m .         Hunter Total Score 5/13/2021   Total score - Hunter 7          GENERAL APPEARANCE: healthy, alert, no distress, cooperative and smiling  EYES: Eyes grossly normal to inspection  RESP: Unlabored, easy breathing with normal conversational speech  CV: color normal  NEURO: Alert and oriented x3, speech normal  PSYCH: mentation appears normal and affect normal/bright  Mallampati Class: Unable to assess  Tonsillar Stage: Unable to assess    Impression/Plan:  1. Witnessed apneic spells  2. Snoring  3. Morbid obesity (H)  4. Daytime somnolence    - SLEEP EVALUATION & MANAGEMENT REFERRAL - " "ADULT -[facility] [place] [phone] (Age 15 and up)  - HST-Home Sleep Apnea Test; Future    This is a pleasant 31-year-old transgender patient with a past medical history pertinent for depression who presents via video telehealth visit with reports of nightly snoring, gasping, choking, snorting, poor quality of sleep and \"dehydrated\" for at least the last 6 months.  The patient's symptoms and risk factors are consistent with possible obstructive sleep apnea syndrome.    We discussed the pathophysiology of obstructive sleep apnea and the risks associated with untreated SHERRI.  We also discussed the pros and cons of in lab polysomnography versus home sleep testing.  The patient has elected to undergo HST to further evaluate for sleep disordered breathing.    Literature provided regarding sleep apnea.      She will follow up with me in approximately two weeks after her sleep study has been competed to review the results and discuss plan of care.       Polysomnography reviewed.  Obstructive sleep apnea reviewed.  Complications of untreated sleep apnea were reviewed.      ELIA Falk CNP  Sleep Medicine      40 minutes were spent on the date of the encounter doing chart review, history and exam, documentation and further activities as noted above.       CC: Val Love        "

## 2021-05-17 NOTE — PROGRESS NOTES
TK is a 31 year old who is being evaluated via a billable video visit.      How would you like to obtain your AVS? MyChart  If the video visit is dropped, the invitation should be resent by: Send to e-mail at: [email or web address]  Will anyone else be joining your video visit? Amanda FUCHS CMA, [facility], 5/17/2021 10:34 AM

## 2021-05-17 NOTE — PATIENT INSTRUCTIONS
Your BMI is Body mass index is 46.06 kg/m .  Weight management is a personal decision.  If you are interested in exploring weight loss strategies, the following discussion covers the approaches that may be successful. Body mass index (BMI) is one way to tell whether you are at a healthy weight, overweight, or obese. It measures your weight in relation to your height.  A BMI of 18.5 to 24.9 is in the healthy range. A person with a BMI of 25 to 29.9 is considered overweight, and someone with a BMI of 30 or greater is considered obese. More than two-thirds of American adults are considered overweight or obese.  Being overweight or obese increases the risk for further weight gain. Excess weight may lead to heart disease and diabetes.  Creating and following plans for healthy eating and physical activity may help you improve your health.  Weight control is part of healthy lifestyle and includes exercise, emotional health, and healthy eating habits. Careful eating habits lifelong are the mainstay of weight control. Though there are significant health benefits from weight loss, long-term weight loss with diet alone may be very difficult to achieve- studies show long-term success with dietary management in less than 10% of people. Attaining a healthy weight may be especially difficult to achieve in those with severe obesity. In some cases, medications, devices and surgical management might be considered.  What can you do?  If you are overweight or obese and are interested in methods for weight loss, you should discuss this with your provider.     Consider reducing daily calorie intake by 500 calories.     Keep a food journal.     Avoiding skipping meals, consider cutting portions instead.    Diet combined with exercise helps maintain muscle while optimizing fat loss. Strength training is particularly important for building and maintaining muscle mass. Exercise helps reduce stress, increase energy, and improves fitness.  Increasing exercise without diet control, however, may not burn enough calories to loose weight.       Start walking three days a week 10-20 minutes at a time    Work towards walking thirty minutes five days a week     Eventually, increase the speed of your walking for 1-2 minutes at time    In addition, we recommend that you review healthy lifestyles and methods for weight loss available through the National Institutes of Health patient information sites:  http://win.niddk.nih.gov/publications/index.htm    And look into health and wellness programs that may be available through your health insurance provider, employer, local community center, or kayla club.    Weight management plan: Discussed healthy diet and exercise guidelines    Patient Education     What Are Snoring and Obstructive Sleep Apnea?  If you ve ever had a stuffed-up nose, you know the feeling of trying to breathe through a very narrow passageway. This is what happens in your throat when you snore. While you sleep, structures in your throat partly block your air passage. This makes the passage narrow and hard to breathe through. In some cases, the entire passage may become blocked so you can t breathe at all. This is called obstructive sleep apnea.      Snoring       Obstructive sleep apnea      Snoring  If your throat structures are too large or the muscles relax too much during sleep, the air passage may be partly blocked for a brief moment. But the air eventually passes through. As air from the nose or mouth passes around this blockage, the throat structures vibrate. This causes the familiar sound of snoring. This snoring sound can be loud and may wake up others, or even themselves, during the night. Snoring gets worse as more and more of the air passage is blocked.   Obstructive sleep apnea  If the structures partly or completely block the throat, air can t flow to the lungs at all. This is called hypopnea (decreased breathing) or apnea (meaning   no breathing ). The lungs aren t getting fresh air. So the brain tells the body to wake up just enough to tighten the muscles and unblock the air passage. With a loud gasp, breathing starts again. This process may be repeated over and over again during the night. This can make your sleep fragmented with lighter stages of sleep. You may not remember waking up many times during the night however due to lighter sleep you will most likely feel tired the next day. The lack of sleep and fresh air can also strain your lungs, heart, and other organs. This may lead to problems such as high blood pressure, diabetes, behavioral disorders, heart attack, or stroke.   Problems in the nose and jaw  Problems in the structure of the nose may block breathing. A crooked (deviated) septum or swollen turbinates can make snoring worse or lead to apnea. Also, a receding jaw may make the tongue sit too far back. Then it s more likely to block the airway when you re asleep.   [employer] last reviewed this educational content on 9/1/2019 2000-2021 [employer]. All rights reserved. This information is not intended as a substitute for professional medical care. Always follow your healthcare professional's instructions.

## 2021-06-23 ENCOUNTER — OFFICE VISIT (OUTPATIENT)
Dept: SLEEP MEDICINE | Facility: CLINIC | Age: 31
End: 2021-06-23
Payer: COMMERCIAL

## 2021-06-23 DIAGNOSIS — R40.0 DAYTIME SOMNOLENCE: ICD-10-CM

## 2021-06-23 DIAGNOSIS — R06.81 WITNESSED APNEIC SPELLS: ICD-10-CM

## 2021-06-23 DIAGNOSIS — R06.83 SNORING: ICD-10-CM

## 2021-06-23 DIAGNOSIS — E66.01 MORBID OBESITY (H): ICD-10-CM

## 2021-06-23 PROCEDURE — G0399 HOME SLEEP TEST/TYPE 3 PORTA: HCPCS | Performed by: FAMILY MEDICINE

## 2021-06-24 ENCOUNTER — DOCUMENTATION ONLY (OUTPATIENT)
Dept: SLEEP MEDICINE | Facility: CLINIC | Age: 31
End: 2021-06-24
Payer: COMMERCIAL

## 2021-06-24 NOTE — PROGRESS NOTES
HST POST-STUDY QUESTIONNAIRE    1. What time did you go to bed?  midnight  2. How long do you think it took to fall asleep?  45 min  3. What time did you wake up to start the day?  0730  4. Did you get up during the night at all?  yes  5. If you woke up, do you remember approximately what time(s)? no  6. Did you have any difficulty with the equipment?  No  7. Did you us any type of treatment with this study?  None  8. Was the head of the bed elevated? No  9. Did you sleep in a recliner?  No  10. Did you stop using CPAP at least 3 days before this test?  NA  11. Any other information you'd like us to know? None    This HSAT was performed using a Noxturnal T3 device which recorded snore, sound, movement activity, body position, nasal pressure, oronasal thermal airflow, pulse, oximetry and both chest and abdominal respiratory effort. HSAT data was restricted to the time patient states they were in bed.     HSAT was scored using 1B 4% hypopnea rule.     HST AHI (Non-PAT): 35.6  Snoring was reported as loud.  Time with SpO2 below 89% was 16 minutes.   Overall signal quality was good     Pt will follow up with sleep provider to determine appropriate therapy.

## 2021-06-25 NOTE — PROCEDURES
"HOME SLEEP STUDY INTERPRETATION    Patient: Javi Carranza  MRN: [id number]  YOB: 1990  Study Date: 6/23/2021  Referring Provider: Val Love  Ordering Provider: Darío Cervantes PA-C     Indications for Home Study: Javi Carranza is a 31 year old adult with a history of depression who presents with symptoms suggestive of obstructive sleep apnea.    Estimated body mass index is 46.06 kg/m  as calculated from the following:    Height as of 5/17/21: 1.6 m (5' 3\").    Weight as of 5/17/21: 117.9 kg (260 lb).  Total score - [place]: 7 (5/13/2021  8:12 AM)    Data: A full night home sleep study was performed recording the standard physiologic parameters including body position, movement, sound, nasal pressure, thermal oral airflow, chest and abdominal movements with respiratory inductance plethysmography, and oxygen saturation by pulse oximetry. Pulse rate was estimated by oximetry recording. This study was considered adequate based on > 4 hours of quality oximetry and respiratory recording. As specified by the AASM Manual for the Scoring of Sleep and Associated events, version 2.3, Rule VIII.D 1B, 4% oxygen desaturation scoring for hypopneas is used as a standard of care on all home sleep apnea testing.    Analysis Time:  446.5 minutes    Respiration:   Sleep Associated Hypoxemia: sustained hypoxemia was not present. Average oxygen saturation was 93.6%.  Time with saturation less than or equal to 88% was 11 minutes. The lowest oxygen saturation was 77%.   Snoring: Snoring was present.  Respiratory events: The home study revealed a presence of 117 obstructive apneas and 5 mixed and central apneas. There were 143 hypopneas resulting in a combined apnea/hypopnea index [AHI] of 35.6 events per hour.  AHI was 37.7 per hour supine, - per hour prone, 31.8 per hour on left side, and 31.8 per hour on right side.   Pattern: Excluding events noted above, respiratory rate and pattern was Normal.    Position: " Percent of time spent: supine - 64.9%, prone - 0%, on left - 29.6%, on right - 5.5%.    Heart Rate: By pulse oximetry normal rate was noted.     Assessment:   Severe obstructive sleep apnea.  Sleep associated hypoxemia was present.    Recommendations:  Consider auto-CPAP at 5-15 cmH2O or polysomnography with full night PAP titration.  Suggest optimizing sleep hygiene and avoiding sleep deprivation.  Weight management.    Diagnosis Code(s): Obstructive Sleep Apnea G47.33, Hypoxemia G47.36    Darío Lopez MD, MD, June 25, 2021   Diplomate, American Board of Family Medicine, Sleep Medicine

## 2021-07-08 ENCOUNTER — VIRTUAL VISIT (OUTPATIENT)
Dept: SLEEP MEDICINE | Facility: CLINIC | Age: 31
End: 2021-07-08
Payer: COMMERCIAL

## 2021-07-08 VITALS — BODY MASS INDEX: 44.39 KG/M2 | HEIGHT: 64 IN | WEIGHT: 260 LBS

## 2021-07-08 DIAGNOSIS — E66.01 MORBID OBESITY WITH BMI OF 40.0-44.9, ADULT (H): ICD-10-CM

## 2021-07-08 DIAGNOSIS — G47.33 OSA (OBSTRUCTIVE SLEEP APNEA): Primary | ICD-10-CM

## 2021-07-08 PROCEDURE — 99213 OFFICE O/P EST LOW 20 MIN: CPT | Mod: GT | Performed by: NURSE PRACTITIONER

## 2021-07-08 ASSESSMENT — MIFFLIN-ST. JEOR: SCORE: 1889.35

## 2021-07-08 NOTE — PATIENT INSTRUCTIONS
Your BMI is Body mass index is 44.63 kg/m .  Weight management is a personal decision.  If you are interested in exploring weight loss strategies, the following discussion covers the approaches that may be successful. Body mass index (BMI) is one way to tell whether you are at a healthy weight, overweight, or obese. It measures your weight in relation to your height.  A BMI of 18.5 to 24.9 is in the healthy range. A person with a BMI of 25 to 29.9 is considered overweight, and someone with a BMI of 30 or greater is considered obese. More than two-thirds of American adults are considered overweight or obese.  Being overweight or obese increases the risk for further weight gain. Excess weight may lead to heart disease and diabetes.  Creating and following plans for healthy eating and physical activity may help you improve your health.  Weight control is part of healthy lifestyle and includes exercise, emotional health, and healthy eating habits. Careful eating habits lifelong are the mainstay of weight control. Though there are significant health benefits from weight loss, long-term weight loss with diet alone may be very difficult to achieve- studies show long-term success with dietary management in less than 10% of people. Attaining a healthy weight may be especially difficult to achieve in those with severe obesity. In some cases, medications, devices and surgical management might be considered.  What can you do?  If you are overweight or obese and are interested in methods for weight loss, you should discuss this with your provider.     Consider reducing daily calorie intake by 500 calories.     Keep a food journal.     Avoiding skipping meals, consider cutting portions instead.    Diet combined with exercise helps maintain muscle while optimizing fat loss. Strength training is particularly important for building and maintaining muscle mass. Exercise helps reduce stress, increase energy, and improves fitness.  "Increasing exercise without diet control, however, may not burn enough calories to loose weight.       Start walking three days a week 10-20 minutes at a time    Work towards walking thirty minutes five days a week     Eventually, increase the speed of your walking for 1-2 minutes at time    In addition, we recommend that you review healthy lifestyles and methods for weight loss available through the National Institutes of Health patient information sites:  http://win.niddk.nih.gov/publications/index.htm    And look into health and wellness programs that may be available through your health insurance provider, employer, local community center, or [employer]la club.    Weight management plan: Patient was referred to their PCP to discuss a diet and exercise plan.  MY INFORMATION ON SLEEP APNEA-  Javi Carranza    DOCTOR : ELIA Falk CNP  SLEEP CENTER :      MY CONTACT NUMBER:   [facility]  [phone]  [facility]   [phone]  [facility]   [phone]      [facility]  [phone]  [facility]   [phone]      Goss Points:  1. What is Obstructive Sleep Apnea (SHERRI)? SHERRI is the most common type of sleep apnea. Apnea literally means, \"without breath.\" It is characterized by repetitive pauses in breathing, despite continued effort to breathe, and is usually associated with a reduction in blood oxygen saturation. Apneas can last 10 to over 60 seconds. It is caused by narrowing or collapse of the upper airway as muscles relax during sleep.   2. What are the consequences of SHERRI? Symptoms include: daytime sleepiness- possibly increasing the risk of falling asleep while driving, unrefreshing/restless sleep, snoring, insomnia, waking frequently to urinate, waking with heartburn or reflux, reduced concentration and memory, and morning headaches. Other health consequences may include development of high " blood pressure. Untreated SHERRI also can contribute to heart disease, stroke and diabetes.   3. What are the treatment options? In most situations, sleep apnea is a lifelong disease that must be managed with daily therapy. Continuous Positive Airway (CPAP) is the most reliable treatment. A mouthguard to hold your jaw forward is usually the next most reliable option. Other options include postioning devices (to keep you off your back), nasal valves, tongue retaining device, weight loss, surgery. There is more detail about these options toward the end of this document.  4. What are the most important things to remember about using CPAP?     WHERE CAN I FIND MORE INFORMATION?    American Academy of Sleep Medicine Patient information on sleep disorders:  http://yoursleep.aasmnet.org    CPAP -  WHY AND HOW?                 Continuous positive airway pressure, or CPAP, is the most effective treatment for obstructive sleep apnea. It works by blowing room air, through a mask, to hold your throat open. A decision to use CPAP is a major step forward in the pursuit of a healthier life. The successful use of CPAP will help you breathe easier, sleep better and live healthier. Using CPAP can be a positive experience if you keep these ly points in mind:  1. Commitment  CPAP is not a quick fix for your problem. It involves a long-term commitment to improve your sleep and your health.    2. Communication  Stay in close communication with both your sleep doctor and your CPAP supplier. Ask lots of questions and seek help when you need it.    3. Consistency  Use CPAP all night, every night and for every nap. You will receive the maximum health benefits from CPAP when you use it every time that you sleep. This will also make it easier for your body to adjust to the treatment.    4. Correction  The first machine and mask that you try may not be the best ones for you. Work with your sleep doctor and your CPAP supplier to make corrections  "to your equipment selection. Ask about trying a different type of machine or mask if you have ongoing problems. Make sure that your mask is a good fit and learn to use your equipment properly.    5. Challenge  Tell a family member or close friend to ask you each morning if you used your CPAP the previous night. Have someone to challenge you to give it your best effort.    6. Connection   Your adjustment to CPAP will be easier if you are able to connect with others who use the same treatment. Ask your sleep doctor if there is a support group in your area for people who have sleep apnea, or look for one on the Internet.  7. Comfort   Increase your level of comfort by using a saline spray, decongestant or heated humidifier if CPAP irritates your nose, mouth or throat. Use your unit's \"ramp\" setting to slowly get used to the air pressure level. There may be soft pads you can buy that will fit over your mask straps. Look on www.CPAP.com for accessories that can help make CPAP use more comfortable.  8. Cleaning   Clean your mask, tubing and headgear on a regular basis. Put this time in your schedule so that you don't forget to do it. Check and replace the filters for your CPAP unit and humidifier.    9. Completion   Although you are never finished with CPAP therapy, you should reward yourself by celebrating the completion of your first month of treatment. Expect this first month to be your hardest period of adjustment. It will involve some trial and error as you find the machine, mask and pressure settings that are right for you.    10. Continuation  After your first month of treatment, continue to make a daily commitment to use your CPAP all night, every night and for every nap.    CPAP-Tips to starting with success:  Begin using your CPAP for short periods of time during the day while you watch TV or read.    Use CPAP every night and for every nap. Using it less often reduces the health benefits and makes it harder for " your body to get used to it.    Newer CPAP models are virtually silent; however, if you find the sound of your CPAP machine to be bothersome, place the unit under your bed to dampen the sound.     Make small adjustments to your mask, tubing, straps and headgear until you get the right fit. Tightening the mask may actually worsen the leak.  If it leaves significant marks on your face or irritates the bridge of your nose, it may not be the best mask for you.  Speak with the person who supplied the mask and consider trying other masks. Insurances will allow you to try different masks during the first month of starting CPAP.  Insurance also covers a new mask, hose and filter about every 6 months.    Use a saline nasal spray to ease mild nasal congestion. Neti-Pot or saline nasal rinses may also help. Nasal gel sprays can help reduce nasal dryness.  Biotene mouthwash can be helpful to protect your teeth if you experience frequent dry mouth.  Dry mouth may be a sign of air escaping out of your mouth or out of the mask in the case of a full face mask.  Speak with your provider if you expect that is the case.     Take a nasal decongestant to relieve more severe nasal or sinus congestion.  Do not use Afrin (oxymetazoline) nasal spray more than 3 days in a row.  Speak with your sleep doctor if your nasal congestion is chronic.    Use a heated humidifier that fits your CPAP model to enhance your breathing comfort. Adjust the heat setting up if you get a dry nose or throat, down if you get condensation in the hose or mask.  Position the CPAP lower than you so that any condensation in the hose drains back into the machine rather than towards the mask.    Try a system that uses nasal pillows if traditional masks give you problems.    Clean your mask, tubing and headgear once a week. Make sure the equipment dries fully.    Regularly check and replace the filters for your CPAP unit and humidifier.    Work closely with your sleep  provider and your CPAP supplier to make sure that you have the machine, mask and air pressure setting that works best for you. It is better to stop using it and call your provider to solve problems than to lay awake all night frustrated with the device.    BESIDES CPAP, WHAT OTHER THERAPIES ARE THERE?    Postioning devices if you only have the snoring or apnea while on your back    Dental devices if your condition is mild    Nasal valves may be effective though experience is limited    Weight loss if you are overweight    Surgery in limited cases where devices are not acceptable or there are problems with structures in the nose and throat  If treated with one of these alternative options, further evaluation is necessary to ensure that the therapy is effective. This may require some form of repeat testing.      Healthy Lifestyle:  Healthy diet, exercise and limit alcohol: Not only will excessive alcohol increase your weight over time, but it irritates the throat tissues and make them swell, shrinking the airway and causing snoring. Drinking alcohol should be limited and stopped within 3-4 hours before going to bed.   Stop smoking: (Red swollen throat, heat, nicotine), also irritates and swells the airway, among numerous other negative health consequences.    Positioning Device  This example shows a pillow that straps around the waist. It may be appropriate for those whose sleep study shows milder sleep apnea that occurs primarily when lying flat on one's back. Preliminary studies have shown benefit but effectiveness at home should be verified.                      Oral Appliance  These are examples of two of many custom-made devices that are more likely to work in mild sleep apnea   Oral appliances are dental mouth pieces that fit very much like a sports mouth guards or removable orthodontic retainers. They are used to treat snoring and obstructive sleep apnea . The device prevents the airway from collapsing by  either supporting the jaw in a forward position. Since oral appliances are non-invasive and easy to use, they may be considered as an early treatment option. Oral appliance therapy (OAT) involves the customization, selection, fabrication, fitting, adjustments and long-term follow-up care.  Custom made oral appliances are proven to be more effective than over-the-counter devices. Therefore, the over-the-counter devices are recommended not to be used as a screening tool nor as a therapeutic option.     Who gets a dental device?  Oral appliance therapy can be used as an alternative to CPAP therapy for the treatment of mild to moderate sleep apnea and for those patients who prefer OAT to CPAP. Oral appliance therapy is a first line therapy for the treatment of primary snoring. Additionally, OAT is an option for those that cannot tolerate CPAP as therapy or who have experienced insufficient surgical results.                 Possible side effects?  Frequent but minor side effects include: excessive salivation, dry mouth, discomfort of teeth and jaw and temporary changes in the patient s bite.  Potential complications include: jaw pain, permanent occlusal changes and TMJ symptoms.  The above mentioned side effects and complications can be recognized and managed by dentists trained in dental sleep medicine.   Finding a dentist that practices dental sleep medicine  Specific training is available through the American Academy of Dental Sleep Medicine for dentists interested in working in the field of sleep. To find a dentist who is educated in the field of sleep and the use of oral appliances, near you, visit the Web site of the American Academy of Dental Sleep Medicine; also see http://www.accpstorage.org/newOrganization/patients/oralAppliances.pdf   To search for a dentist certified in these practices:  Http://aadsm.org/FindADentist.aspx?1  Nasal Valves              Nasal valves may be an option for mild apnea if other  options are not well tolerated. The efficacy of these devices is generally less than CPAP or oral appliances.They may not be effective if you have frequent nasal congestion or have difficulty breathing through your nose.   Weight Loss:    Weight loss decreases severity of sleep apnea in most people with obesity. For those with mild obesity who have developed snoring with weight gain, even 15-30 pound weight loss can improve and occasionally eliminate sleep apnea.  Structured and life-long dietary and health habits are necessary to lose weight and keep healthier weight levels.     Though there are significant health benefits from weight loss, long-term weight loss is very difficult to achieve- studies show success with dietary management in less than 10% of people. In addition, substantial weight loss may require years of dietary control and may be difficult if patients have severe obesity. In these cases, surgical management may be considered.    If you are interested in methods for weight loss, you should review the options discussed at the National Institutes of Health patient information sites:     http://win.niddk.nih.gov/publications/index.htm  http:/www.health.nih.gov/topic/WeightLossDieting    Bariatric programs offer counseling in all methods of weight loss:    Http:/www.[facility].org/Specialties/WeightLossSurgeryandMedicalMgmt/htm    Your BMI is Body mass index is 44.63 kg/m .        Body mass index (BMI) is one way to tell whether you are at a healthy weight, overweight, or obese. It measures your weight in relation to your height.  A BMI of 18.5 to 24.9 is in the healthy range. A person with a BMI of 25 to 29.9 is considered overweight, and someone with a BMI of 30 or greater is considered obese.  Another way to find out if you are at risk for health problems caused by overweight and obesity is to measure your waist. If you are a woman and your waist is more than 35 inches, or if you are a man and  your waist is more than 40 inches, your risk of disease may be higher.  More than two-thirds of American adults are considered overweight or obese. Being overweight or obese increases the risk for further weight gain.  Excess weight may lead to heart disease and diabetes. Creating and following plans for healthy eating and physical activity may help you improve your health.    Methods for maintaining or losing weight.    Weight control is part of healthy lifestyle and includes exercise, emotional health, and healthy eating habits.  Careful eating habits lifelong is the mainstay of weight control.  Though there are significant health benefits from weight loss, long-term weight loss with diet alone may be very difficult to achieve- studies show long-term success with dietary management in less than 10% of people. Attaining a healthy weight may be especially difficult to achieve in those with severe obesity. In some cases, medications, devices and surgical management might be considered.    What can you do?    If you are overweight or obese and are interested in methods for weight loss, you should discuss this with your provider. In addition, we recommend that you review healthy life styles and methods for weight loss available through the National Institutes of Health patient information sites:     http://win.niddk.nih.gov/publications/index.htm      Surgery:  There are a number of surgeries that have been attempted to treat apnea. In general, surgical options are usually reserved for cases in which there is a physical abnormality contributing to obstruction or other treatment options are ineffective or not tolerated. Most surgical options are either unreliable or quite invasive. One of the more common procedures is:  Uvulopalatopharyngoplasty: In this procedure, the uvula (the finger-like tissue that hangs in the back of the throat), part of the soft palate (the tissue that the uvula is attached to), and sometimes  "the tonsils or adenoids are removed. The efficacy of this surgery is around 30-50% .  After surgery, complications may include:  Sleepiness and sleep apnea related to post-surgery medication   Swelling, infection and bleeding   A sore throat and/or difficulty swallowing   Drainage of secretions into the nose and a nasal quality to the voice. English language speech does not seem to be affected by this surgery.   Narrowing of the airway in the nose and throat (hence constricting breathing) snoring and even iatrogenically caused sleep apnea. By cutting the tissues, excess scar tissue can \"tighten\" the airway and make it even smaller than it was before UPPP.  Patients who have had the uvula removed will become unable to correctly speak Azeri or any other language that has a uvular 'r' phoneme.    Surgeries to help resolve nasal congestion may help reduce the severity of apnea slightly. Nasal congestion does not cause apnea on its own, so these surgeries are usually not performed just for SHERRI.  They may be worth considering if the nasal congestion is significantly bothersome independent of apnea.          "

## 2021-07-08 NOTE — PROGRESS NOTES
"Does patient have any form of state insurance? n    Do you have wifi? y  Do you have a smart phone? y  Can you download an rafael on your phone comfortably with out assistance? y  Can you watch a Youtube video? y    TK is a 31 year old who is being evaluated via a billable video visit.      How would you like to obtain your AVS? MyChart  If the video visit is dropped, the invitation should be resent by: Send to e-mail at: [email or web address]  Will anyone else be joining your video visit? No  {If patient encounters technical issues they should call [phone] :[id number]}    Mary Caruso CMA on 7/8/2021 at 9:25 AM    Video Start Time: {video visit start/end time for provider to select:303856}  Video-Visit Details    Type of service:  Video Visit    Video End Time:{video visit start/end time for provider to select:152948}    Originating Location (pt. Location): {video visit patient location:964211::\"Home\"}    Distant Location (provider location):  [facility]     Platform used for Video Visit: {Virtual Visit Platforms:064507::\"Cloudfind\"}    "

## 2021-07-21 ENCOUNTER — DOCUMENTATION ONLY (OUTPATIENT)
Dept: SLEEP MEDICINE | Facility: CLINIC | Age: 31
End: 2021-07-21

## 2021-07-21 NOTE — PROGRESS NOTES
7/21/2021- JL-  PUT ON RECALL WAITLIST AND LEFT VM EXPLAINING WAITLIST. LEFT  TO CALL [phone] IF QUESTIONS.

## 2021-07-26 ENCOUNTER — TELEPHONE (OUTPATIENT)
Dept: SLEEP MEDICINE | Facility: CLINIC | Age: 31
End: 2021-07-26

## 2021-07-28 ENCOUNTER — DOCUMENTATION ONLY (OUTPATIENT)
Dept: SLEEP MEDICINE | Facility: CLINIC | Age: 31
End: 2021-07-28

## 2021-07-28 NOTE — PROGRESS NOTES
Patient was offered choice of vendor and chose [facility].  Patient Javi Carranza was set up at [facility] on July 28, 2021. Patient received a Resmed AirSense 10 Auto. Pressures were set at 5-15 cm H2O.   Patient s ramp is 5 cm H2O for Auto and FLEX/EPR is 2.  Patient received a Resmed Mask name: airfit f20  Full Face mask size Medium, heated tubing and heated humidifier.  Patient does not need to meet compliance.  Earline Vallejo

## 2021-08-02 ENCOUNTER — DOCUMENTATION ONLY (OUTPATIENT)
Dept: SLEEP MEDICINE | Facility: CLINIC | Age: 31
End: 2021-08-02
Payer: COMMERCIAL

## 2021-08-02 NOTE — PROGRESS NOTES
3 day Sleep therapy management telephone visit    Diagnostic AHI:  35.6 HST    Confirmed with patient at time of call- N/A Patient is still interested in STM service       Message left for patient to return call        Objective data     Order Settings for PAP  CPAP min 5    CPAP max 15        Device settings from machine CPAP min 5.0     CPAP max 15.0      EPR Setting TWO    RESMED soft response  OFF     Assessment: Nightly usage over four hours      Action plan: Patient to have 14 day STM visit. Patient has a follow up visit scheduled:   no    Replacement device: No  STM ordered by provider: Yes     Total time spent on accessing and  interpreting remote patient PAP therapy data  10 minutes    Total time spent counseling, coaching  and reviewing PAP therapy data with patient  1 minutes    [id number] no

## 2021-08-12 ENCOUNTER — DOCUMENTATION ONLY (OUTPATIENT)
Dept: SLEEP MEDICINE | Facility: CLINIC | Age: 31
End: 2021-08-12

## 2021-08-12 NOTE — PROGRESS NOTES
14  DAY STM VISIT    Diagnostic AHI:   35.6  HST    Subjective measures:   Patient reports feels that things are going well.  They have some complaints of dryness.   No issues with mask fit or pressures.     Assessment: Pt meeting objective benchmarks.  Patient failing following subjective benchmarks: dryness      Action plan: pt to have 30 day STM visit.   Humidity changed remotely     Device type: Auto-CPAP    PAP settings: CPAP min 5.0 cm  H20       CPAP max 15.0 cm  H20          95th% pressure 13.6 cm  H20        RESMED EPR level Setting: TWO    RESMED Soft response setting:  OFF    Mask type:  Per patient choice    Objective measures: 14 day rolling measures      Compliance  71 %      Leak  15.71  lpm  last  upload      AHI 1.93   last  upload      Average number of minutes 323      Objective measure goal  Compliance   Goal >70%  Leak   Goal < 24 lpm  AHI  Goal < 5  Usage  Goal >240        Total time spent on accessing and interpreting remote patient PAP therapy data  10 minutes    Total time spent counseling, coaching  and reviewing PAP therapy data with patient  4 minutes    [id number]  [id number]  no (3 day STM)

## 2021-09-01 ENCOUNTER — DOCUMENTATION ONLY (OUTPATIENT)
Dept: SLEEP MEDICINE | Facility: CLINIC | Age: 31
End: 2021-09-01

## 2021-09-15 ENCOUNTER — DOCUMENTATION ONLY (OUTPATIENT)
Dept: SLEEP MEDICINE | Facility: CLINIC | Age: 31
End: 2021-09-15

## 2021-09-16 NOTE — PROGRESS NOTES
STM recheck     Diagnostic AHI:   35.6  HST    Data only recheck     Assessment: Pt meeting objective benchmarks.   Leak is improving     Action plan: follow up per provider request      Device type: Auto-CPAP    PAP settings: CPAP min 5.0 cm  H20       CPAP max 15.0 cm  H20           95th% pressure 13.1 cm  H20        RESMED EPR level Setting: TWO    RESMED Soft response setting:  OFF    Mask type:  Per patient choice    Objective measures: 14 day rolling measures      Compliance  71 %      Leak  35.45  lpm  last  upload      AHI 2.59   last  upload      Average number of minutes 308      Objective measure goal  Compliance   Goal >70%  Leak   Goal < 24 lpm  AHI  Goal < 5  Usage  Goal >240        Total time spent on accessing and interpreting remote patient PAP therapy data  10 minutes    Total time spent counseling, coaching  and reviewing PAP therapy data with patient  0 minutes    [id number]

## 2021-10-11 ENCOUNTER — HEALTH MAINTENANCE LETTER (OUTPATIENT)
Age: 31
End: 2021-10-11

## 2022-05-22 ENCOUNTER — HEALTH MAINTENANCE LETTER (OUTPATIENT)
Age: 32
End: 2022-05-22

## 2022-09-24 ENCOUNTER — HEALTH MAINTENANCE LETTER (OUTPATIENT)
Age: 32
End: 2022-09-24

## 2023-06-04 ENCOUNTER — HEALTH MAINTENANCE LETTER (OUTPATIENT)
Age: 33
End: 2023-06-04

## 2024-03-19 ENCOUNTER — TELEPHONE (OUTPATIENT)
Dept: FAMILY MEDICINE | Facility: CLINIC | Age: 34
End: 2024-03-19
Payer: COMMERCIAL

## 2024-03-19 NOTE — TELEPHONE ENCOUNTER
General Call    Contacts         Type Contact Phone/Fax    03/19/2024 09:09 AM CDT Phone (Incoming) Alhaji      [facility], Release of Information Department          Reason for Call:  Sleep Study     What are your questions or concerns:  Alhaji with [facility], Release of Information Department states that Dr. Hand is looking for the images, graphics, and charts from the sleep study. They received the results, but in order to create an order for supplies they need the images, graphics and charts from the sleep study. Please fax to Fax: [phone]. They do not need a call back unless there is questions or anything.     Date of last appointment with provider: 07/08/2021    Could we send this information to you in [employer] or would you prefer to receive a phone call?:   No preference   Okay to leave a detailed message?: Yes at Other phone number:  [phone] 8 AM-5PM Eastern Time,  after hours

## 2024-07-14 ENCOUNTER — HEALTH MAINTENANCE LETTER (OUTPATIENT)
Age: 34
End: 2024-07-14